# Patient Record
Sex: MALE | Race: WHITE | HISPANIC OR LATINO | Employment: FULL TIME | URBAN - METROPOLITAN AREA
[De-identification: names, ages, dates, MRNs, and addresses within clinical notes are randomized per-mention and may not be internally consistent; named-entity substitution may affect disease eponyms.]

---

## 2019-02-25 ENCOUNTER — TELEPHONE (OUTPATIENT)
Dept: SCHEDULING | Facility: IMAGING CENTER | Age: 24
End: 2019-02-25

## 2019-04-11 ENCOUNTER — OFFICE VISIT (OUTPATIENT)
Dept: MEDICAL GROUP | Facility: LAB | Age: 24
End: 2019-04-11
Payer: COMMERCIAL

## 2019-04-11 VITALS
SYSTOLIC BLOOD PRESSURE: 120 MMHG | OXYGEN SATURATION: 98 % | WEIGHT: 261.6 LBS | DIASTOLIC BLOOD PRESSURE: 62 MMHG | HEART RATE: 76 BPM | HEIGHT: 70 IN | BODY MASS INDEX: 37.45 KG/M2 | TEMPERATURE: 98.6 F

## 2019-04-11 DIAGNOSIS — Z23 NEED FOR VACCINATION: ICD-10-CM

## 2019-04-11 DIAGNOSIS — G89.29 CHRONIC PAIN OF RIGHT KNEE: ICD-10-CM

## 2019-04-11 DIAGNOSIS — E66.9 OBESITY (BMI 35.0-39.9 WITHOUT COMORBIDITY): ICD-10-CM

## 2019-04-11 DIAGNOSIS — M25.561 CHRONIC PAIN OF RIGHT KNEE: ICD-10-CM

## 2019-04-11 DIAGNOSIS — M79.644 THUMB PAIN, RIGHT: ICD-10-CM

## 2019-04-11 PROCEDURE — 90715 TDAP VACCINE 7 YRS/> IM: CPT | Performed by: FAMILY MEDICINE

## 2019-04-11 PROCEDURE — 90471 IMMUNIZATION ADMIN: CPT | Performed by: FAMILY MEDICINE

## 2019-04-11 PROCEDURE — 99204 OFFICE O/P NEW MOD 45 MIN: CPT | Mod: 25 | Performed by: FAMILY MEDICINE

## 2019-04-11 ASSESSMENT — PATIENT HEALTH QUESTIONNAIRE - PHQ9: CLINICAL INTERPRETATION OF PHQ2 SCORE: 0

## 2019-04-11 NOTE — PROGRESS NOTES
Subjective:     CC:  There were no encounter diagnoses.    HISTORY OF THE PRESENT ILLNESS: Patient is a 23 y.o. male. This pleasant patient is here today     Right knee pain:  This is a chronic unstable issue, new to me.  Patient states for the last 2 years he has had chronic right knee pain.  He states that it always been there but most recently it has been exacerbated and he has been unable to go to the gym.  He states that the pain is to the medial and lateral side of his patella.  He states that sometimes he hears a crunching noise.  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  He denies any trauma or injury.  He denies any effusion, popping, warmth, or erythema.    Thumb pain:  This is a chronic unstable issue, new to me.  Patient has a history of right thumb pain because he does separate the mail a lot.  He was seen by a chiropractor was told that he had arthritic pain.  He states this does not bother him as much but he often does find himself cracking his fingers.  He denies any numbness or tingling.  He denies any nodularity.    Obesity:  This is a chronic and stable issue, new to me.  Patient has a history of obesity and he states he has been overweight for a long time.  He does not do any current exercises his knee is been bothering him.  He does do exercise off and on if he wears a brace.  He does not have a particular low-fat or low carb diet.  He does have family history of diabetes.    Allergies: Patient has no known allergies.    Current Outpatient Prescriptions Ordered in [facility]   Medication Sig Dispense Refill   • Fish Oil-Cholecalciferol (OMEGA-3 + D PO) Take  by mouth.     • Multiple Vitamin (ONE-A-DAY MENS PO) Take  by mouth.       No current Epic-ordered facility-administered medications on file.        History reviewed. No pertinent past medical history.    History reviewed. No pertinent surgical history.    Social History   Substance Use Topics   • Smoking status:  "Never Smoker   • Smokeless tobacco: Never Used   • Alcohol use No       Social History     Social History Narrative   • No narrative on file       History reviewed. No pertinent family history.    ROS:   Gen: no fevers/chills, no changes in weight  Eyes: no changes in vision  ENT: no sore throat, no hearing loss, no bloody nose  Pulm: no sob, no cough  CV: no chest pain, no palpitations  GI: no nausea/vomiting, no diarrhea  : no dysuria  MSk: no myalgias, +Knee pain   Skin: no rash  Neuro: no headaches, no numbness/tingling  Heme/Lymph: no easy bruising    Objective:     Exam: /62 (BP Location: Left arm, Patient Position: Sitting)   Pulse 76   Temp 37 °C (98.6 °F) (Temporal)   Ht 1.778 m (5' 10\")   Wt 118.7 kg (261 lb 9.6 oz)   SpO2 98%  Body mass index is 37.54 kg/m².    General: Normal appearing. No distress.  HEENT: Normocephalic. Eyes conjunctiva clear lids without ptosis, pupils equal  and reactive to light accommodation, ears normal shape and contour, canals are clear bilaterally, tympanic membranes are benign, nasal mucosa benign, oropharynx is without erythema, edema or exudates.   Neck: Supple without JVD or bruit. Thyroid is not enlarged.  Pulmonary: Clear to ausculation.  Normal effort. No rales, ronchi, or wheezing.  Cardiovascular: Regular rate and rhythm without murmur. Carotid and radial pulses are intact and equal bilaterally.  Abdomen: Soft, nontender, nondistended. Normal bowel sounds. Liver and spleen are not palpable  Neurologic: Grossly nonfocal  Lymph: No cervical or supraclavicular lymph nodes are palpable  Skin: Warm and dry.  No obvious lesions.  Musculoskeletal: Normal gait. No extremity cyanosis, clubbing, or edema over R thumb. Knees: No erythema/edema/ecchymosis/effusion. Joint line tenderness. Patellar grind test +. Lachman's -. Jakob's -. ROM intact. 5/5 strength bilaterally. 2+ deep tendon reflexes bilaterally.  Psych: Normal mood and affect. Alert and oriented x3. " Judgment and insight is normal.    Assessment & Plan:   23 y.o. male with the following -    1. Chronic pain of right knee  Patient to do supportive care as discussed. Likely Arthritic vs patellofemoral syndrome.  Discussed weight loss and physical therapy.  Discussed brace use patient to also wear more supportive shoes as he is always on his feet and walking.  And we discussed proper carrying methods.  Follow-up post physical therapy.  - REFERRAL TO PHYSICAL THERAPY Reason for Therapy: Eval/Treat/Report    2. Obesity (BMI 35.0-39.9 without comorbidity)  Patient's weight was discussed today, including healthy low-carb diet, 30-minutes of moderate exercise daily and avoiding sugars and high-fat foods.    - Patient identified as having weight management issue.  Appropriate orders and counseling given.  - HEMOGLOBIN A1C; Future  - CBC WITH DIFFERENTIAL; Future  - Comp Metabolic Panel; Future  - Lipid Profile; Future  - TSH WITH REFLEX TO FT4; Future    3. Thumb pain, right  Discussed brace use and supportive care.  Patient also well to physical therapy for his right thumb pain.  Likely arthritic in nature.  Patient does overuse this trigger thumb.  Follow-up post physical therapy  - REFERRAL TO PHYSICAL THERAPY Reason for Therapy: Eval/Treat/Report    4. Need for vaccination  Administered today  - Tdap =>6yo IM      Please note that this dictation was created using voice recognition software. I have made every reasonable attempt to correct obvious errors, but I expect that there are errors of grammar and possibly content that I did not discover before finalizing the note.

## 2022-02-05 ENCOUNTER — HOSPITAL ENCOUNTER (OUTPATIENT)
Dept: RADIOLOGY | Facility: MEDICAL CENTER | Age: 27
End: 2022-02-05
Attending: FAMILY MEDICINE
Payer: COMMERCIAL

## 2022-02-05 ENCOUNTER — OFFICE VISIT (OUTPATIENT)
Dept: URGENT CARE | Facility: PHYSICIAN GROUP | Age: 27
End: 2022-02-05
Payer: COMMERCIAL

## 2022-02-05 VITALS
OXYGEN SATURATION: 99 % | WEIGHT: 286 LBS | SYSTOLIC BLOOD PRESSURE: 140 MMHG | BODY MASS INDEX: 40.04 KG/M2 | TEMPERATURE: 98.6 F | HEIGHT: 71 IN | DIASTOLIC BLOOD PRESSURE: 80 MMHG

## 2022-02-05 DIAGNOSIS — S99.911A INJURY OF RIGHT ANKLE, INITIAL ENCOUNTER: ICD-10-CM

## 2022-02-05 DIAGNOSIS — S93.401A SPRAIN OF RIGHT ANKLE, UNSPECIFIED LIGAMENT, INITIAL ENCOUNTER: ICD-10-CM

## 2022-02-05 PROCEDURE — 73610 X-RAY EXAM OF ANKLE: CPT | Mod: RT

## 2022-02-05 PROCEDURE — 99203 OFFICE O/P NEW LOW 30 MIN: CPT | Performed by: FAMILY MEDICINE

## 2022-02-05 RX ORDER — IBUPROFEN 200 MG
200 TABLET ORAL ONCE
Status: COMPLETED | OUTPATIENT
Start: 2022-02-05 | End: 2022-02-05

## 2022-02-05 RX ADMIN — Medication 200 MG: at 10:48

## 2022-02-05 ASSESSMENT — ENCOUNTER SYMPTOMS: ROS SKIN COMMENTS: NO ABRASION OR LACERATION

## 2022-02-05 NOTE — LETTER
February 5, 2022         Patient: Sukumar Leija   YOB: 1995   Date of Visit: 2/5/2022           To Whom it May Concern:    Sukumar Leija was seen in my clinic on 2/5/2022. Please excuse from work 2/7 through 2/9/2022.      Sincerely,           Jerome Nugent M.D.  Electronically Signed

## 2022-02-05 NOTE — PROGRESS NOTES
"Subjective     Sukumar Leija is a 26 y.o. male who presents with Ankle Injury (Rolled Rt ankle,on a rock walking Dogs. Happened lastnight, with swelling and pain)            Onset yesterday right ankle inversion injury.  He rolled it on a rock while walking his dog.  Pain and swelling lateral aspect.  Swelling is improved with elevation and compression.  There is some bruising.  He is ambulatory with limp.  No prior injury or surgery.  No other aggravating or alleviating factors.      Review of Systems   Musculoskeletal:        No knee pain   Skin:        No abrasion or laceration                Objective     /80   Temp 37 °C (98.6 °F)   Ht 1.803 m (5' 11\")   Wt (!) 130 kg (286 lb)   SpO2 99%   BMI 39.89 kg/m²      Physical Exam  Constitutional:       Appearance: Normal appearance.   Musculoskeletal:      Comments: R ankle: tender lateral malleolus. Stable anterior drawer. Lateral soft tissue swelling. No other point bony tenderness of ankle, foot, or leg. Distal neuro/vascular intact. Skin intact.      Neurological:      Mental Status: He is alert.                             Assessment & Plan       Xray: no fracture or dislocation per radiology    1. Injury of right ankle, initial encounter  DX-ANKLE 3+ VIEWS RIGHT    ibuprofen (MOTRIN) tablet 200 mg   2. Sprain of right ankle, unspecified ligament, initial encounter       Differential diagnosis, natural history, supportive care, and indications for immediate follow-up discussed at length.     Relative rest, ice, nsaid prn. Elevation and compression prn swelling. Resume activity as tolerated.    Ankle brace placed to use as needed.              "

## 2022-02-12 ENCOUNTER — TELEPHONE (OUTPATIENT)
Dept: URGENT CARE | Facility: PHYSICIAN GROUP | Age: 27
End: 2022-02-12

## 2022-09-09 ENCOUNTER — OFFICE VISIT (OUTPATIENT)
Dept: URGENT CARE | Facility: PHYSICIAN GROUP | Age: 27
End: 2022-09-09
Payer: COMMERCIAL

## 2022-09-09 VITALS
SYSTOLIC BLOOD PRESSURE: 136 MMHG | DIASTOLIC BLOOD PRESSURE: 80 MMHG | OXYGEN SATURATION: 98 % | RESPIRATION RATE: 16 BRPM | WEIGHT: 285 LBS | HEART RATE: 80 BPM | HEIGHT: 70 IN | BODY MASS INDEX: 40.8 KG/M2 | TEMPERATURE: 97.6 F

## 2022-09-09 DIAGNOSIS — S39.012A STRAIN OF LUMBAR REGION, INITIAL ENCOUNTER: ICD-10-CM

## 2022-09-09 PROCEDURE — 99213 OFFICE O/P EST LOW 20 MIN: CPT | Performed by: STUDENT IN AN ORGANIZED HEALTH CARE EDUCATION/TRAINING PROGRAM

## 2022-09-09 RX ORDER — METAXALONE 800 MG/1
800 TABLET ORAL 3 TIMES DAILY
COMMUNITY
End: 2023-07-21

## 2022-09-09 RX ORDER — CYCLOBENZAPRINE HCL 5 MG
5-10 TABLET ORAL 3 TIMES DAILY PRN
Qty: 15 TABLET | Refills: 0 | Status: SHIPPED | OUTPATIENT
Start: 2022-09-09 | End: 2023-07-21

## 2022-09-09 RX ORDER — NAPROXEN 500 MG/1
500 TABLET ORAL 2 TIMES DAILY WITH MEALS
Qty: 14 TABLET | Refills: 0 | Status: SHIPPED | OUTPATIENT
Start: 2022-09-09 | End: 2022-09-16

## 2022-09-09 RX ORDER — IBUPROFEN 200 MG
200 TABLET ORAL EVERY 6 HOURS PRN
COMMUNITY
End: 2022-11-19

## 2022-09-09 NOTE — LETTER
[facility] [place]  [address] [address]     September 9, 2022    Patient: Suukmar Leija   YOB: 1995   Date of Visit: 9/9/2022       To Whom It May Concern:    Sukumar Leija was seen and treated in our department on 9/9/2022.  Patient is excused from work on 9/9/2022 through 9/10/2022.  May return to work on 9/12/2022.  Please try and accommodate the patient as far as reducing excessive heavy lifting if possible.    Sincerely,     Anil Rowley P.A.-C.

## 2022-09-09 NOTE — PROGRESS NOTES
"Subjective:   Sukumar Leija is a 26 y.o. male who presents for Back Pain (Lower back pain, felt strain very hard. Hard to stand for prolonged time. )      HPI:  Pleasant 26-year-old male presents to clinic for 1 day of lower back pain.  He states that he was working on his car and was moving a car sun when he felt a strain in his lower back.  He reports a history of lumbar strain approximately 3 years ago.  He states that the pain is mainly on the right side of his lower back and is worse with standing up and sitting.  He is also uncomfortable when he bends forward.  He denies any numbness, tingling, burning, radiation of pain into the legs, saddle anesthesia, loss of bladder control, loss of bowel control, syncopal episode, headache, dizziness, chest pain, palpitations, shortness of breath, nausea, vomiting, abdominal pain, blurred vision, double vision, fever, or chills.      Medications:    cyclobenzaprine  ibuprofen Tabs  metaxalone Tabs  naproxen Tabs    Allergies: Patient has no known allergies.    Problem List: Sukumar Leija does not have any pertinent problems on file.    Surgical History:  No past surgical history on file.    Past Social Hx: Sukumar Leija  reports that he has never smoked. He has never used smokeless tobacco. He reports that he does not drink alcohol and does not use drugs.     Past Family Hx:  Sukumar Leija family history includes No Known Problems in his brother, father, mother, and sister.     Problem list, medications, and allergies reviewed by myself today in Epic.     Objective:     /80   Pulse 80   Temp 36.4 °C (97.6 °F) (Tympanic)   Resp 16   Ht 1.778 m (5' 10\")   Wt (!) 129 kg (285 lb)   SpO2 98%   BMI 40.89 kg/m²     Physical Exam  Vitals reviewed.   Constitutional:       General: He is not in acute distress.     Appearance: Normal appearance.   Eyes:      Pupils: Pupils are equal, round, and reactive to light.   Cardiovascular:      Rate and Rhythm: Normal rate and " regular rhythm.      Pulses: Normal pulses.      Heart sounds: Normal heart sounds. No murmur heard.  Pulmonary:      Effort: Pulmonary effort is normal. No respiratory distress.      Breath sounds: Normal breath sounds. No stridor. No wheezing, rhonchi or rales.   Musculoskeletal:      Lumbar back: Spasms present. No swelling, deformity, lacerations, tenderness or bony tenderness. Normal range of motion. Negative right straight leg raise test and negative left straight leg raise test.      Comments: Lumbar back: Increased muscle tone on the right side when compared to the left.  No crepitus, step-offs, or midline spinal tenderness.  Sensation intact.  Pain is worse with standing, sitting to standing, sitting, and bending forward.  2+ patellar reflexes bilaterally.   Skin:     General: Skin is warm and dry.      Capillary Refill: Capillary refill takes less than 2 seconds.      Findings: No erythema, lesion or rash.   Neurological:      General: No focal deficit present.      Mental Status: He is alert and oriented to person, place, and time.       Assessment/Plan:     Diagnosis and associated orders:     1. Strain of lumbar region, initial encounter  naproxen (NAPROSYN) 500 MG Tab    cyclobenzaprine (FLEXERIL) 5 mg tablet         Comments/MDM:     Patient's presentation physical exam findings are consistent with strain of the lumbar back specifically on the right side.  No radicular symptoms.  He is neurovascularly intact.  No signs of cauda equina.  Was given prescription for naproxen and cyclobenzaprine.  He was educated on the use of both these medications and the possible side effects.  He was advised not to drive or go to work while using his muscle relaxant.  He was advised on increased risk of falls with this medication.  He is agreeable to the plan.  Discussed using heat 3-5 times per day for 20 minutes at a time.  Perform frequent 3 to 5 minutes walks as tolerated.  Lying in 9090 position.  ED/return  precautions were given.  Patient has good understanding the signs and symptoms of warrant immediate reevaluation.         Differential diagnosis, natural history, supportive care, and indications for immediate follow-up discussed.    Advised the patient to follow-up with the primary care physician for recheck, reevaluation, and consideration of further management.    Please note that this dictation was created using voice recognition software. I have made a reasonable attempt to correct obvious errors, but I expect that there are errors of grammar and possibly content that I did not discover before finalizing the note.    Electronically signed by Anil Rowley PA-C.

## 2022-11-19 ENCOUNTER — OFFICE VISIT (OUTPATIENT)
Dept: URGENT CARE | Facility: PHYSICIAN GROUP | Age: 27
End: 2022-11-19
Payer: COMMERCIAL

## 2022-11-19 VITALS
SYSTOLIC BLOOD PRESSURE: 138 MMHG | HEART RATE: 81 BPM | TEMPERATURE: 97.7 F | BODY MASS INDEX: 38.64 KG/M2 | HEIGHT: 71 IN | WEIGHT: 276 LBS | DIASTOLIC BLOOD PRESSURE: 88 MMHG | OXYGEN SATURATION: 100 % | RESPIRATION RATE: 20 BRPM

## 2022-11-19 DIAGNOSIS — H66.002 NON-RECURRENT ACUTE SUPPURATIVE OTITIS MEDIA OF LEFT EAR WITHOUT SPONTANEOUS RUPTURE OF TYMPANIC MEMBRANE: ICD-10-CM

## 2022-11-19 PROCEDURE — 99213 OFFICE O/P EST LOW 20 MIN: CPT | Performed by: NURSE PRACTITIONER

## 2022-11-19 RX ORDER — IBUPROFEN 800 MG/1
800 TABLET ORAL EVERY 8 HOURS PRN
Qty: 30 TABLET | Refills: 0 | Status: SHIPPED | OUTPATIENT
Start: 2022-11-19 | End: 2022-11-29

## 2022-11-19 RX ORDER — AMOXICILLIN AND CLAVULANATE POTASSIUM 875; 125 MG/1; MG/1
1 TABLET, FILM COATED ORAL 2 TIMES DAILY
Qty: 14 TABLET | Refills: 0 | Status: SHIPPED | OUTPATIENT
Start: 2022-11-19 | End: 2022-11-26

## 2022-11-19 ASSESSMENT — ENCOUNTER SYMPTOMS
GASTROINTESTINAL NEGATIVE: 1
CONSTITUTIONAL NEGATIVE: 1
EYES NEGATIVE: 1
FEVER: 0
RESPIRATORY NEGATIVE: 1
CARDIOVASCULAR NEGATIVE: 1

## 2022-11-19 ASSESSMENT — PAIN SCALES - GENERAL: PAINLEVEL: 4=SLIGHT-MODERATE PAIN

## 2022-11-19 NOTE — PROGRESS NOTES
"Subjective:   Sukumar Leija is a 27 y.o. male who presents for Otalgia (Left, per pt dryness of ear canal)      Patient presents today with a 3-day history of onset of acute left ear pain.  He does have an ear camera with software, and shows me the images of the progression of the redness and exudates.  His right ear is not affected.  Patient denies any other systemic symptoms.  No fevers no chills.  He does not have a history of frequent ear infections.      Otalgia   There is pain in the left ear. This is a new problem. Episode onset: three days ago. The problem occurs constantly. The problem has been gradually worsening. There has been no fever. The pain is at a severity of 6/10. The pain is moderate. Associated symptoms include ear discharge. He has tried NSAIDs for the symptoms. The treatment provided mild relief.     Review of Systems   Constitutional: Negative.  Negative for fever.   HENT:  Positive for ear discharge and ear pain.    Eyes: Negative.    Respiratory: Negative.     Cardiovascular: Negative.    Gastrointestinal: Negative.    Skin: Negative.      Medications, Allergies, and current problem list reviewed today in Epic.     Objective:     /88 (BP Location: Left arm, Patient Position: Sitting, BP Cuff Size: Adult)   Pulse 81   Temp 36.5 °C (97.7 °F) (Temporal)   Resp 20   Ht 1.803 m (5' 11\")   Wt (!) 125 kg (276 lb)   SpO2 100%     Physical Exam  Vitals reviewed.   Constitutional:       Appearance: Normal appearance.   HENT:      Head: Normocephalic and atraumatic.      Right Ear: Tympanic membrane, ear canal and external ear normal.      Left Ear: Drainage, swelling and tenderness present. Tympanic membrane is erythematous and bulging.      Nose: Nose normal.      Mouth/Throat:      Mouth: Mucous membranes are moist.      Pharynx: Oropharynx is clear.   Eyes:      Extraocular Movements: Extraocular movements intact.      Conjunctiva/sclera: Conjunctivae normal.      Pupils: Pupils are " equal, round, and reactive to light.   Cardiovascular:      Rate and Rhythm: Normal rate and regular rhythm.      Pulses: Normal pulses.      Heart sounds: Normal heart sounds.   Pulmonary:      Effort: Pulmonary effort is normal.      Breath sounds: Normal breath sounds.   Abdominal:      General: Abdomen is flat. Bowel sounds are normal.      Palpations: Abdomen is soft.   Musculoskeletal:         General: Normal range of motion.      Cervical back: Normal range of motion and neck supple.   Skin:     General: Skin is warm and dry.      Capillary Refill: Capillary refill takes less than 2 seconds.   Neurological:      General: No focal deficit present.      Mental Status: He is alert and oriented to person, place, and time.   Psychiatric:         Mood and Affect: Mood normal.         Behavior: Behavior normal.       Assessment/Plan:     Diagnosis and associated orders:     1. Non-recurrent acute suppurative otitis media of left ear without spontaneous rupture of tympanic membrane  amoxicillin-clavulanate (AUGMENTIN) 875-125 MG Tab    ibuprofen (MOTRIN) 800 MG Tab         Comments/MDM:     Provided patient information on the etiology and pathogenesis of otitis media. Instructed to take antibiotics as prescribed. Ibuprofen 800 mg TID PRN was given to patient for pain. May apply warm compress to the ear for prn discomfort. RTC in 2 weeks for reevaluation, sooner if not improved.           Differential diagnosis, natural history, supportive care, and indications for immediate follow-up discussed.    Advised the patient to follow-up with the primary care physician for recheck, reevaluation, and consideration of further management.    Please note that this dictation was created using voice recognition software. I have made a reasonable attempt to correct obvious errors, but I expect that there are errors of grammar and possibly content that I did not discover before finalizing the note.    This note was electronically  signed by DOYLE Montenegro

## 2022-11-19 NOTE — LETTER
November 19, 2022       Patient: Sukumar Leija   YOB: 1995   Date of Visit: 11/19/2022         To Whom It May Concern:    In my medical opinion, I recommend that Sukumar Leija be excused from work today due to medical condition.    If you have any questions or concerns, please don't hesitate to call [phone]          Sincerely,          JM Pena  Electronically Signed

## 2022-12-24 ENCOUNTER — APPOINTMENT (OUTPATIENT)
Dept: URGENT CARE | Facility: PHYSICIAN GROUP | Age: 27
End: 2022-12-24
Payer: COMMERCIAL

## 2022-12-24 ENCOUNTER — OFFICE VISIT (OUTPATIENT)
Dept: URGENT CARE | Facility: PHYSICIAN GROUP | Age: 27
End: 2022-12-24
Payer: COMMERCIAL

## 2022-12-24 VITALS
SYSTOLIC BLOOD PRESSURE: 122 MMHG | RESPIRATION RATE: 16 BRPM | BODY MASS INDEX: 38.64 KG/M2 | OXYGEN SATURATION: 98 % | TEMPERATURE: 97.1 F | HEART RATE: 100 BPM | HEIGHT: 71 IN | WEIGHT: 276 LBS | DIASTOLIC BLOOD PRESSURE: 76 MMHG

## 2022-12-24 DIAGNOSIS — M62.830 LUMBAR PARASPINAL MUSCLE SPASM: ICD-10-CM

## 2022-12-24 PROCEDURE — 99213 OFFICE O/P EST LOW 20 MIN: CPT | Performed by: PHYSICIAN ASSISTANT

## 2022-12-24 RX ORDER — IBUPROFEN 200 MG
200 TABLET ORAL EVERY 6 HOURS PRN
COMMUNITY
End: 2023-07-21

## 2022-12-24 RX ORDER — METHYLPREDNISOLONE 4 MG/1
TABLET ORAL
Qty: 1 EACH | Refills: 0 | Status: SHIPPED | OUTPATIENT
Start: 2022-12-24 | End: 2023-07-21

## 2022-12-24 ASSESSMENT — ENCOUNTER SYMPTOMS
CHILLS: 0
WEAKNESS: 0
VOMITING: 0
SENSORY CHANGE: 1
NAUSEA: 0
TINGLING: 0
FLANK PAIN: 0
BACK PAIN: 1
FOCAL WEAKNESS: 0
FEVER: 0

## 2022-12-24 NOTE — LETTER
December 24, 2022       Patient: Sukumar Leija   YOB: 1995   Date of Visit: 12/24/2022         To Whom It May Concern:    In my medical opinion, I recommend that Sukumar Leija should be excused from work for next Tuesday and Wednesday, 12/27 and 12/28.      If you have any questions or concerns, please don't hesitate to call [phone]          Sincerely,          Michael Suárez P.A.-C.  Electronically Signed

## 2022-12-24 NOTE — PROGRESS NOTES
"Subjective:   Sukumar Leija  is a 27 y.o. male who presents for Flank Pain (X 1 day, R lower flank pain, throbbing pain, standing up and sitting down causes pain to worsen )      HPI  Patient presents urgent care noting pain to right low back after lifting a package yesterday.  Patient states he bent over to lift a heavier package yesterday.  As he flipped it on right side he felt a stinging shooting pain on right low back.  He states the pain traveled down right leg just below the knee.  Patient notes past medical history of 2 similar episodes once around 4 years ago had another time this last fall.  Patient states he took describe a week to improve.  Last time he was prescribed naproxen and cyclobenzaprine.  Patient states he did not take these medications but did improve thereafter.  Denies numbness tingling weakness.  Denies saddle anesthesia or incontinence.  Denies fevers chills nausea vomiting.  Denies abdominal pain.  Denies dysuria frequency urgency or hematuria.  Denies any abnormal urinary symptoms.  Patient has tried stretching for symptoms thus far. Patient denies a history of back surgeries or significant injuries.        Review of Systems   Constitutional:  Negative for chills and fever.   Gastrointestinal:  Negative for nausea and vomiting.   Genitourinary: Negative.  Negative for dysuria, flank pain, frequency and hematuria.   Musculoskeletal:  Positive for back pain.   Skin:  Negative for rash.   Neurological:  Positive for sensory change (radiating pain). Negative for tingling, focal weakness and weakness.     Allergies   Allergen Reactions    Lactose         Objective:   /76   Pulse 100   Temp 36.2 °C (97.1 °F) (Temporal)   Resp 16   Ht 1.803 m (5' 11\")   Wt (!) 125 kg (276 lb)   SpO2 98%   BMI 38.49 kg/m²     Physical Exam  Vitals and nursing note reviewed.   Constitutional:       General: He is not in acute distress.     Appearance: Normal appearance. He is well-developed. He is " not diaphoretic.   HENT:      Head: Normocephalic and atraumatic.      Right Ear: External ear normal.      Left Ear: External ear normal.      Nose: Nose normal.   Eyes:      General: No scleral icterus.        Right eye: No discharge.         Left eye: No discharge.      Conjunctiva/sclera: Conjunctivae normal.   Pulmonary:      Effort: Pulmonary effort is normal. No respiratory distress.   Musculoskeletal:      Cervical back: Neck supple.      Lumbar back: Spasms and tenderness ( primary paraspinous) present. No swelling, edema, deformity, signs of trauma, lacerations or bony tenderness. Decreased range of motion ( 2/2 pain). Negative right straight leg raise test and negative left straight leg raise test.   Skin:     General: Skin is warm and dry.      Coloration: Skin is not pale.   Neurological:      Mental Status: He is alert and oriented to person, place, and time. He is not disoriented.      Sensory: No sensory deficit.      Coordination: Coordination normal.      Deep Tendon Reflexes: Reflexes are normal and symmetric.      Comments: SLR normal bilat       Assessment/Plan:   1. Lumbar paraspinal muscle spasm  - methylPREDNISolone (MEDROL DOSEPAK) 4 MG Tablet Therapy Pack; Take as directed on package.  Dispense one package.  Dispense: 1 Each; Refill: 0    Other orders  - ibuprofen (MOTRIN) 200 MG Tab; Take 200 mg by mouth every 6 hours as needed.  Recommend conservative care, rest, ice, elevation, work on gentle ROM exercises, Cautioned regarding potential side effects of steroid, avoid nsaids while using  Recommend to use the cyclobenzaprine patient has at home and has not used yet however  Return to clinic with lack of resolution or progression of symptoms.      I have worn an N95 mask, gloves and eye protection for the entire encounter with this patient.     Differential diagnosis, natural history, supportive care, and indications for immediate follow-up discussed.

## 2023-07-21 ENCOUNTER — OFFICE VISIT (OUTPATIENT)
Dept: URGENT CARE | Facility: PHYSICIAN GROUP | Age: 28
End: 2023-07-21
Payer: COMMERCIAL

## 2023-07-21 VITALS
SYSTOLIC BLOOD PRESSURE: 128 MMHG | RESPIRATION RATE: 18 BRPM | HEART RATE: 75 BPM | TEMPERATURE: 97.6 F | DIASTOLIC BLOOD PRESSURE: 76 MMHG | OXYGEN SATURATION: 99 % | BODY MASS INDEX: 42.32 KG/M2 | HEIGHT: 70 IN | WEIGHT: 295.6 LBS

## 2023-07-21 DIAGNOSIS — H66.004 RECURRENT ACUTE SUPPURATIVE OTITIS MEDIA OF RIGHT EAR WITHOUT SPONTANEOUS RUPTURE OF TYMPANIC MEMBRANE: ICD-10-CM

## 2023-07-21 PROCEDURE — 3078F DIAST BP <80 MM HG: CPT | Performed by: FAMILY MEDICINE

## 2023-07-21 PROCEDURE — 99213 OFFICE O/P EST LOW 20 MIN: CPT | Performed by: FAMILY MEDICINE

## 2023-07-21 PROCEDURE — 3074F SYST BP LT 130 MM HG: CPT | Performed by: FAMILY MEDICINE

## 2023-07-21 RX ORDER — IBUPROFEN 800 MG/1
800 TABLET ORAL EVERY 8 HOURS PRN
Qty: 30 TABLET | Refills: 0 | Status: SHIPPED | OUTPATIENT
Start: 2023-07-21 | End: 2023-07-31

## 2023-07-21 RX ORDER — AMOXICILLIN AND CLAVULANATE POTASSIUM 875; 125 MG/1; MG/1
1 TABLET, FILM COATED ORAL 2 TIMES DAILY
Qty: 14 TABLET | Refills: 0 | Status: SHIPPED | OUTPATIENT
Start: 2023-07-21 | End: 2023-07-28

## 2023-07-21 ASSESSMENT — ENCOUNTER SYMPTOMS
NAUSEA: 0
EYE DISCHARGE: 0
WEIGHT LOSS: 0
EYE REDNESS: 0
MYALGIAS: 0
VOMITING: 0

## 2023-07-21 NOTE — LETTER
July 21, 2023         Patient: Sukumar Leija   YOB: 1995   Date of Visit: 7/21/2023           To Whom it May Concern:    Sukumar Leija was seen in my clinic on 7/21/2023. Please excuse from work 7/21 and 7/22/2023. He may then return to his next scheduled shift to full duty.       Sincerely,           Jerome Nugent M.D.  Electronically Signed

## 2023-07-21 NOTE — PROGRESS NOTES
"Yari Leija is a 27 y.o. male who presents with Otalgia (X 5 days, R sided ear px )            5 days right ear pain.  No nasal congestion or viral prodrome.  No trauma or barotrauma.  No hearing loss.  No recent swimming.  PMH recurrent otitis media.  No other aggravating or alleviating factors.        Review of Systems   Constitutional:  Negative for malaise/fatigue and weight loss.   Eyes:  Negative for discharge and redness.   Gastrointestinal:  Negative for nausea and vomiting.   Musculoskeletal:  Negative for joint pain and myalgias.   Skin:  Negative for itching and rash.              Objective     /76   Pulse 75   Temp 36.4 °C (97.6 °F) (Temporal)   Resp 18   Ht 1.778 m (5' 10\")   Wt (!) 134 kg (295 lb 9.6 oz)   SpO2 99%   BMI 42.41 kg/m²      Physical Exam  Constitutional:       General: He is not in acute distress.     Appearance: He is well-developed.   HENT:      Head: Normocephalic and atraumatic.      Left Ear: Tympanic membrane normal.      Ears:      Comments: Right TM is red and bulging  Eyes:      Conjunctiva/sclera: Conjunctivae normal.   Cardiovascular:      Rate and Rhythm: Normal rate and regular rhythm.      Heart sounds: Normal heart sounds. No murmur heard.  Pulmonary:      Effort: Pulmonary effort is normal.      Breath sounds: Normal breath sounds. No wheezing.   Skin:     General: Skin is warm and dry.      Findings: No rash.   Neurological:      Mental Status: He is alert.                             Assessment & Plan     1. Recurrent acute suppurative otitis media of right ear without spontaneous rupture of tympanic membrane  amoxicillin-clavulanate (AUGMENTIN) 875-125 MG Tab    Referral to establish with [facility] PCP    ibuprofen (MOTRIN) 800 MG Tab        Differential diagnosis, natural history, supportive care, and indications for immediate follow-up were discussed.              "

## 2023-11-10 ENCOUNTER — APPOINTMENT (OUTPATIENT)
Dept: MEDICAL GROUP | Facility: PHYSICIAN GROUP | Age: 28
End: 2023-11-10
Payer: COMMERCIAL

## 2023-12-06 ENCOUNTER — OFFICE VISIT (OUTPATIENT)
Dept: MEDICAL GROUP | Facility: PHYSICIAN GROUP | Age: 28
End: 2023-12-06
Payer: COMMERCIAL

## 2023-12-06 VITALS
BODY MASS INDEX: 41.3 KG/M2 | TEMPERATURE: 97.1 F | WEIGHT: 295 LBS | HEART RATE: 96 BPM | DIASTOLIC BLOOD PRESSURE: 76 MMHG | RESPIRATION RATE: 20 BRPM | OXYGEN SATURATION: 100 % | HEIGHT: 71 IN | SYSTOLIC BLOOD PRESSURE: 114 MMHG

## 2023-12-06 DIAGNOSIS — Z13.29 SCREENING FOR ENDOCRINE, METABOLIC AND IMMUNITY DISORDER: ICD-10-CM

## 2023-12-06 DIAGNOSIS — L85.8 KERATOSIS PILARIS: ICD-10-CM

## 2023-12-06 DIAGNOSIS — G89.29 CHRONIC RIGHT-SIDED LOW BACK PAIN WITH RIGHT-SIDED SCIATICA: ICD-10-CM

## 2023-12-06 DIAGNOSIS — M54.41 CHRONIC RIGHT-SIDED LOW BACK PAIN WITH RIGHT-SIDED SCIATICA: ICD-10-CM

## 2023-12-06 DIAGNOSIS — Z13.0 SCREENING FOR ENDOCRINE, METABOLIC AND IMMUNITY DISORDER: ICD-10-CM

## 2023-12-06 DIAGNOSIS — Z23 NEED FOR VACCINATION: ICD-10-CM

## 2023-12-06 DIAGNOSIS — Z13.228 SCREENING FOR ENDOCRINE, METABOLIC AND IMMUNITY DISORDER: ICD-10-CM

## 2023-12-06 DIAGNOSIS — F98.8 ATTENTION DEFICIT DISORDER, UNSPECIFIED HYPERACTIVITY PRESENCE: ICD-10-CM

## 2023-12-06 DIAGNOSIS — E55.9 VITAMIN D DEFICIENCY: ICD-10-CM

## 2023-12-06 DIAGNOSIS — R07.89 CHEST PRESSURE: ICD-10-CM

## 2023-12-06 PROCEDURE — 90471 IMMUNIZATION ADMIN: CPT

## 2023-12-06 PROCEDURE — 3074F SYST BP LT 130 MM HG: CPT

## 2023-12-06 PROCEDURE — 99214 OFFICE O/P EST MOD 30 MIN: CPT | Mod: 25

## 2023-12-06 PROCEDURE — 3078F DIAST BP <80 MM HG: CPT

## 2023-12-06 PROCEDURE — 90686 IIV4 VACC NO PRSV 0.5 ML IM: CPT

## 2023-12-06 RX ORDER — IBUPROFEN 800 MG/1
800 TABLET ORAL EVERY 8 HOURS PRN
Qty: 30 TABLET | Refills: 1 | Status: SHIPPED | OUTPATIENT
Start: 2023-12-06 | End: 2024-02-22 | Stop reason: SDUPTHER

## 2023-12-06 ASSESSMENT — ENCOUNTER SYMPTOMS
SHORTNESS OF BREATH: 0
WHEEZING: 0
CONSTIPATION: 0
HEADACHES: 0
PALPITATIONS: 0
ABDOMINAL PAIN: 0
TINGLING: 0
FEVER: 0
DIARRHEA: 0
BLOOD IN STOOL: 0
CHILLS: 0
WEIGHT LOSS: 0
COUGH: 0
DIZZINESS: 0

## 2023-12-06 ASSESSMENT — PATIENT HEALTH QUESTIONNAIRE - PHQ9: CLINICAL INTERPRETATION OF PHQ2 SCORE: 0

## 2023-12-06 NOTE — PROGRESS NOTES
"Subjective:     Chief Complaint   Patient presents with    Establish Care    Other     Chest pressure x 3 years      Sukumar Leija is a 28 y.o. male, who is here today to establish care and discuss chest pressure and medication refill. His prior PCP was at [facility] in [place].    Problem   Chronic Right-Sided Low Back Pain With Right-Sided Sciatica    Chronic problem.    In 2019, patient was at work when he lifted a very heavy package causing low back pain.  Went to urgent care after this occurred was prescribed muscle relaxer.  Physical therapy was not prescribed.  No imaging was done.  Since then patient occasionally will take a 800 mg ibuprofen once a week.  Regularly sees a chiropractor which he has an appointment for today.    Requesting refill of ibuprofen.     Chest Pressure    Chronic problem.  Patient began having this chest pressure or slight heaviness approximately 3 years ago around 2019.  Patient reports that this time he had lost 30 pounds and then gained back the weight plus more.  This was around the same time he had a back injury at work.  Denies any chest pain, headaches, lightheadedness/dizziness.    Positive for occasional shortness of breath but not with every 1 of these episodes.    Denies history of anxiety but patient does note that he is concerned about his sister and her drug/alcohol problems at times.     Keratosis Pilaris    Chronic problem.  Patient has had this dry bumpy, \"dot-like\" skin on the back of his upper arms for several years now.  Patient reports that his sisters also have this.  Patient is concerned that it is a problem.     Bmi 40.0-44.9, Adult (Hcc)    Chronic problem.  Exercise: Works as a mailman.  Has not been going to the gym regularly but is going to start.  Diet: Not a lot of red meat mostly eats chicken or turkey.  Does note that he eats out a lot such as [employer] or other fast food due to his job.  Reported trying meal prep/planning but gets bored with " "this.  Patient concerned about developing diabetes as he recently found out both of his grandmothers passed away from diabetes.  His mother may possibly have prediabetes.        Allergies: Lactose    Current Outpatient Medications:     ibuprofen (MOTRIN) 800 MG Tab, Take 1 Tablet by mouth every 8 hours as needed for Moderate Pain., Disp: 30 Tablet, Rfl: 1     Review of Systems   Constitutional:  Negative for chills, fever and weight loss.   Respiratory:  Negative for cough, shortness of breath and wheezing.    Cardiovascular:  Negative for chest pain, palpitations and leg swelling.   Gastrointestinal:  Negative for abdominal pain, blood in stool, constipation and diarrhea.   Genitourinary:  Negative for hematuria.   Neurological:  Negative for dizziness, tingling and headaches.     Health Maintenance: Completed    Objective:     /76   Pulse 96   Temp 36.2 °C (97.1 °F) (Temporal)   Resp 20   Ht 1.803 m (5' 11\")   Wt (!) 134 kg (295 lb)   SpO2 100%  Body mass index is 41.14 kg/m².    Physical Exam  Vitals reviewed.   Constitutional:       Appearance: Normal appearance.   Cardiovascular:      Rate and Rhythm: Normal rate and regular rhythm.   Pulmonary:      Effort: Pulmonary effort is normal.      Breath sounds: Normal breath sounds.   Abdominal:      Palpations: Abdomen is soft.   Musculoskeletal:         General: Normal range of motion.   Skin:     General: Skin is warm and dry.   Neurological:      General: No focal deficit present.      Mental Status: He is alert.   Psychiatric:         Mood and Affect: Mood normal.         Behavior: Behavior normal.     Assessment & Plan:     The following plan was discussed through shared decision making with the patient:    1. Chest pressure  Chronic, controlled.  Per HPI and physical exam, seems more anxiety related.  Discussed possible breathing exercises.  Patient also feels like this is a lot to do with his weight gain.  Instructed patient that if the symptoms " persist or worsen to seek urgent or emergent care for further follow-up and evaluation.  In office EKG unavailable today, may consider in the future for baseline evaluation.    2. BMI 40.0-44.9, adult (HCC)  Chronic, uncontrolled.  Positive family history for diabetes.  Patient reports over the holiday weekend his mother checked his morning fasting blood sugars, which were only slightly above 100.    Recommendations for healthy lifestyle include:  - Good rule of thumb for portions = size of the palm of your hand  - Eliminate or decrease the amount of all white carbohydrates such as white rice, white bread, white pasta, potatoes, etc.    - Focus on whole foods that are less processed foods and more of plant/animal proteins; great examples are lean poultry or fish, vegetables, nuts, beans, and berries.   - Aim for at least 15-20 grams of fiber per day.    - Avoid drinking sugary beverages such as juice, soda, specialty coffee; these have very little nutritional value and are high in calories.   - Exercise: 150 minutes of moderate aerobic activity per week OR 75 minutes of vigorous aerobic activity per week + 2 days per week of strength  Discussed these recommendations at length. We will check updated labs.  - Comp Metabolic Panel; Future  - HEMOGLOBIN A1C; Future  - Lipid Profile; Future  - TSH WITH REFLEX TO FT4; Future    3. Chronic right-sided low back pain with right-sided sciatica  Chronic, controlled.  History of back strain from his work.  Takes ibuprofen 800 mg daily very sparingly.  Recommended that if this worsens may consider physical therapy or imaging such as an x-ray of his lumbar spine.  - ibuprofen (MOTRIN) 800 MG Tab; Take 1 Tablet by mouth every 8 hours as needed for Moderate Pain.  Dispense: 30 Tablet; Refill: 1    4. Attention deficit disorder, unspecified hyperactivity presence  Chronic, uncontrolled.  Patient reports being diagnosed as a child and being on medication but has not been on  mediaction for some time.  Would like to consider restarting.  - Referral to Psychiatry    5. Keratosis pilaris  Chronic, uncontrolled.  Educated patient on pathogenesis and etiology of KP. Discussed daily exfoliation with patient and utilizing specific hydrating lotions such as CeraVe, Eucerin, or AmLactin which can help minimize the bumps.    6. Screening for endocrine, metabolic and immunity disorder  - CBC WITH DIFFERENTIAL; Future    7. Vitamin D deficiency  - VITAMIN D,25 HYDROXY (DEFICIENCY); Future    8. Need for vaccination  - Influenza Vaccine Quad Injection (PF)      Return in about 3 months (around 3/6/2024) for Labs.         Please note that this dictation was created using voice recognition software. I have made every reasonable attempt to correct obvious errors, but I expect that there are errors of grammar and possibly content that I did not discover before finalizing the note.    DOYLE Myers  [facility]  [facility]

## 2023-12-16 ENCOUNTER — OFFICE VISIT (OUTPATIENT)
Dept: URGENT CARE | Facility: PHYSICIAN GROUP | Age: 28
End: 2023-12-16
Payer: COMMERCIAL

## 2023-12-16 ENCOUNTER — HOSPITAL ENCOUNTER (OUTPATIENT)
Dept: RADIOLOGY | Facility: MEDICAL CENTER | Age: 28
End: 2023-12-16
Attending: NURSE PRACTITIONER
Payer: COMMERCIAL

## 2023-12-16 VITALS
WEIGHT: 296 LBS | RESPIRATION RATE: 16 BRPM | SYSTOLIC BLOOD PRESSURE: 118 MMHG | TEMPERATURE: 97.3 F | OXYGEN SATURATION: 97 % | DIASTOLIC BLOOD PRESSURE: 76 MMHG | BODY MASS INDEX: 41.44 KG/M2 | HEART RATE: 117 BPM | HEIGHT: 71 IN

## 2023-12-16 DIAGNOSIS — M25.562 ACUTE PAIN OF LEFT KNEE: ICD-10-CM

## 2023-12-16 PROCEDURE — 3078F DIAST BP <80 MM HG: CPT | Performed by: NURSE PRACTITIONER

## 2023-12-16 PROCEDURE — 99213 OFFICE O/P EST LOW 20 MIN: CPT | Performed by: NURSE PRACTITIONER

## 2023-12-16 PROCEDURE — 3074F SYST BP LT 130 MM HG: CPT | Performed by: NURSE PRACTITIONER

## 2023-12-16 PROCEDURE — 73562 X-RAY EXAM OF KNEE 3: CPT | Mod: LT

## 2023-12-16 ASSESSMENT — ENCOUNTER SYMPTOMS
FOCAL WEAKNESS: 0
MYALGIAS: 0
TINGLING: 0
FEVER: 0
SENSORY CHANGE: 0
CHILLS: 0

## 2023-12-16 NOTE — LETTER
December 16, 2023        Sukumar Heladio  [address]  [place] [address]        Sukumar was seen in our clinic today and he is excused from work. Thank you.   If you have any questions or concerns, please don't hesitate to call.        Sincerely,        ENRIQUE Salmeron.P.CANELO.    Electronically Signed

## 2023-12-16 NOTE — PROGRESS NOTES
Subjective     Sukumar Leija is a 28 y.o. male who presents with Knee Pain (Left side, inner knee pain, constant pain, sharp pain with movement /X 3 days )            HPI  New problem.  Patient is a very pleasant 28-year-old male who presents with left-sided knee pain on the inner aspect of his knee for 3 days.  He reports constant sharp pain with movement.  He denies any trauma or prior history of knee issues.  He does work as a  for work.  He denies any associated ankle pain, or hip pain.  He has been wearing a knee brace for work however no other treatment plan.    Lactose  Current Outpatient Medications on File Prior to Visit   Medication Sig Dispense Refill    ibuprofen (MOTRIN) 800 MG Tab Take 1 Tablet by mouth every 8 hours as needed for Moderate Pain. 30 Tablet 1     No current facility-administered medications on file prior to visit.     Social History     Socioeconomic History    Marital status: Single     Spouse name: Not on file    Number of children: Not on file    Years of education: Not on file    Highest education level: Not on file   Occupational History    Not on file   Tobacco Use    Smoking status: Former     Types: Cigarettes    Smokeless tobacco: Never   Vaping Use    Vaping Use: Never used   Substance and Sexual Activity    Alcohol use: No    Drug use: No    Sexual activity: Not Currently     Partners: Female   Other Topics Concern    Not on file   Social History Narrative    Not on file     Social Determinants of Health     Financial Resource Strain: Not on file   Food Insecurity: Not on file   Transportation Needs: Not on file   Physical Activity: Not on file   Stress: Not on file   Social Connections: Not on file   Intimate Partner Violence: Not on file   Housing Stability: Not on file     Breast Cancer-related family history is not on file.      Review of Systems   Constitutional:  Negative for chills and fever.   Musculoskeletal:  Positive for joint pain. Negative for  "myalgias.   Skin: Negative.    Neurological:  Negative for tingling, sensory change and focal weakness.   All other systems reviewed and are negative.             Objective     /76   Pulse (!) 117   Temp 36.3 °C (97.3 °F) (Temporal)   Resp 16   Ht 1.803 m (5' 11\")   Wt (!) 134 kg (296 lb)   SpO2 97%   BMI 41.28 kg/m²      Physical Exam  Constitutional:       Appearance: Normal appearance. He is obese. He is not ill-appearing.   Cardiovascular:      Rate and Rhythm: Regular rhythm. Tachycardia present.      Heart sounds: No murmur heard.  Pulmonary:      Effort: Pulmonary effort is normal.      Breath sounds: Normal breath sounds.   Musculoskeletal:         General: Normal range of motion.      Left knee: Crepitus present. No swelling, deformity, effusion, erythema or ecchymosis. Normal range of motion. Tenderness present over the medial joint line.   Skin:     General: Skin is warm and dry.      Findings: No bruising.   Neurological:      General: No focal deficit present.      Mental Status: He is alert and oriented to person, place, and time.                             Assessment & Plan        1. Acute pain of left knee  DX-KNEE 3 VIEWS Pain/Deformity Following Trauma        Imaging of left knee within normal limits.  Patient advised to continue the knee brace as needed for work.  He is advised on icing and the initiation of anti-inflammatories for this.  He is advised to keep elevated when he is not at work.  Follow-up as needed or if symptoms or not improving in the next 10 to 14 days.               "

## 2024-02-22 DIAGNOSIS — G89.29 CHRONIC RIGHT-SIDED LOW BACK PAIN WITH RIGHT-SIDED SCIATICA: ICD-10-CM

## 2024-02-22 DIAGNOSIS — M54.41 CHRONIC RIGHT-SIDED LOW BACK PAIN WITH RIGHT-SIDED SCIATICA: ICD-10-CM

## 2024-02-23 NOTE — TELEPHONE ENCOUNTER
Received request via: Patient    Was the patient seen in the last year in this department? Yes    Does the patient have an active prescription (recently filled or refills available) for medication(s) requested? No    Pharmacy Name: [facility]     Does the patient have [facility] Plus and need 100 day supply (blood pressure, diabetes and cholesterol meds only)? Patient does not have SCP

## 2024-02-26 RX ORDER — IBUPROFEN 800 MG/1
800 TABLET ORAL EVERY 8 HOURS PRN
Qty: 30 TABLET | Refills: 0 | Status: SHIPPED | OUTPATIENT
Start: 2024-02-26

## 2024-03-06 NOTE — PROGRESS NOTES
"Subjective:     Chief Complaint   Patient presents with    Follow-Up     HPI: Sukumar presents today with    Problem   Chest Pressure    Patient reports that this has improved since he has started working out more in the pain and closer attention to his diet.  Patient also was recently seen by a psychiatrist and started on Ritalin due to ADHD.  Patient states that a lot of his symptoms have improved since then.    Interval history:  12/6/2023- Patient began having this chest pressure or slight heaviness approximately 3 years ago around 2019.  Patient reports that this time he had lost 30 pounds and then gained back the weight plus more.  This was around the same time he had a back injury at work.  Denies any chest pain, headaches, lightheadedness/dizziness.    Positive for occasional shortness of breath but not with every 1 of these episodes.    Denies history of anxiety but patient does note that he is concerned about his sister and her drug/alcohol problems at times.     Keratosis Pilaris    Since his last visit, patient has been using more emollient lotions and exfoliating scrubs.  States that this has improved    Interval history:  12/6/2023- Patient has had this dry bumpy, \"dot-like\" skin on the back of his upper arms for several years now.  Patient reports that his sisters also have this.  Patient is concerned that it is a problem.     Bmi 40.0-44.9, Adult (Hcc)    Chronic problem.  Exercise: Works as a mailman.  Been routinely going to the gym and noticed a lot of his symptoms have improved.  Diet: Not a lot of red meat mostly eats chicken or turkey.  Patient has been trying to improve upon his diet.   Patient states that he did lose some weight since her last visit but has gained some back.  Patient concerned about developing diabetes as he recently found out both of his grandmothers passed away from diabetes.  His mother may possibly have prediabetes.     Allergies: Lactose  Review of Systems " "  Constitutional:  Negative for chills, fever and weight loss.   Respiratory:  Negative for cough, shortness of breath and wheezing.    Cardiovascular:  Negative for chest pain, palpitations and leg swelling.   Gastrointestinal:  Negative for abdominal pain, blood in stool, constipation, diarrhea, nausea and vomiting.   Genitourinary:  Negative for hematuria.   Skin:  Negative for itching and rash.   Neurological:  Negative for dizziness, tingling and headaches.     Health Maintenance: Completed   Objective:     /76   Pulse 74   Temp 36.1 °C (96.9 °F) (Temporal)   Resp 20   Ht 1.803 m (5' 11\")   Wt (!) 135 kg (298 lb)   SpO2 99%   BMI 41.56 kg/m²  Body mass index is 41.56 kg/m².     Physical Exam  Vitals reviewed.   Constitutional:       General: He is not in acute distress.     Appearance: Normal appearance. He is not ill-appearing.   Cardiovascular:      Rate and Rhythm: Normal rate and regular rhythm.   Pulmonary:      Effort: Pulmonary effort is normal. No respiratory distress.   Skin:     Coloration: Skin is not jaundiced or pale.   Neurological:      General: No focal deficit present.      Mental Status: He is alert and oriented to person, place, and time.   Psychiatric:         Mood and Affect: Mood normal.         Behavior: Behavior normal.         Thought Content: Thought content normal.         Judgment: Judgment normal.       Assessment and Plan:     The following treatment plan was discussed through shared decision making with the patient:    1. Chest pressure  Chronic, controlled.  At this time patient feels like this is resolved since seeing psychiatrist and improving overall diet and lifestyle modifications.  Reinforced patient to seek out urgent care if this persists or develops any signs or symptoms associate with shortness of breath at rest or crushing chest pain.  This chest pressure was likely associated with anxiety..    2. BMI 40.0-44.9, adult (HCC)  Chronic ongoing.  Patient has " noted an overall improvement in his health even with weight gain.  I discussed with the patient that this likely could be an increase in muscle mass with fat loss.  We reviewed that the lean muscle mass can help lose weight even at rest the larger amount of lean muscle mass you have.  Encourage patient to continue with improvement of diet and lifestyle modifications.  Patient will get updated lab work within the next week to further screen for any possible prediabetes or underlying conditions.  Reviewed some of his medications and encourage patient to start taking his calcium tablets at night so that his other medications are better absorbed.    3. Keratosis pilaris  Chronic, controlled.  Continue with emollient creams/lotions as well as exfoliating to help decrease the presence of KP.      Return in about 6 months (around 9/7/2024) for Annual.         Please note that this note was created using dictation with voice recognition software. I have made every reasonable attempt to correct obvious errors, but I expect that there are errors of grammar and possibly content that I did not discover before finalizing the note.    DOYLE Myers  [facility]  [facility]

## 2024-03-07 ENCOUNTER — OFFICE VISIT (OUTPATIENT)
Dept: MEDICAL GROUP | Facility: PHYSICIAN GROUP | Age: 29
End: 2024-03-07
Payer: COMMERCIAL

## 2024-03-07 VITALS
WEIGHT: 298 LBS | RESPIRATION RATE: 20 BRPM | HEIGHT: 71 IN | OXYGEN SATURATION: 99 % | DIASTOLIC BLOOD PRESSURE: 76 MMHG | HEART RATE: 74 BPM | SYSTOLIC BLOOD PRESSURE: 130 MMHG | TEMPERATURE: 96.9 F | BODY MASS INDEX: 41.72 KG/M2

## 2024-03-07 DIAGNOSIS — L85.8 KERATOSIS PILARIS: ICD-10-CM

## 2024-03-07 DIAGNOSIS — R07.89 CHEST PRESSURE: ICD-10-CM

## 2024-03-07 PROCEDURE — 3078F DIAST BP <80 MM HG: CPT

## 2024-03-07 PROCEDURE — 3075F SYST BP GE 130 - 139MM HG: CPT

## 2024-03-07 PROCEDURE — 99213 OFFICE O/P EST LOW 20 MIN: CPT

## 2024-03-07 RX ORDER — OMEGA-3 FATTY ACIDS/FISH OIL 300-1000MG
CAPSULE ORAL
COMMUNITY

## 2024-03-07 RX ORDER — METHYLPHENIDATE HYDROCHLORIDE 5 MG/1
5 TABLET ORAL
COMMUNITY
Start: 2024-02-20

## 2024-03-07 RX ORDER — LANOLIN ALCOHOL/MO/W.PET/CERES
CREAM (GRAM) TOPICAL
COMMUNITY

## 2024-03-07 ASSESSMENT — ENCOUNTER SYMPTOMS
FEVER: 0
NAUSEA: 0
WHEEZING: 0
CONSTIPATION: 0
CHILLS: 0
WEIGHT LOSS: 0
BLOOD IN STOOL: 0
COUGH: 0
ABDOMINAL PAIN: 0
SHORTNESS OF BREATH: 0
DIZZINESS: 0
VOMITING: 0
HEADACHES: 0
PALPITATIONS: 0
TINGLING: 0
DIARRHEA: 0

## 2024-03-07 ASSESSMENT — PATIENT HEALTH QUESTIONNAIRE - PHQ9: CLINICAL INTERPRETATION OF PHQ2 SCORE: 0

## 2024-03-11 ENCOUNTER — HOSPITAL ENCOUNTER (OUTPATIENT)
Dept: LAB | Facility: MEDICAL CENTER | Age: 29
End: 2024-03-11
Payer: COMMERCIAL

## 2024-03-11 DIAGNOSIS — Z13.29 SCREENING FOR ENDOCRINE, METABOLIC AND IMMUNITY DISORDER: ICD-10-CM

## 2024-03-11 DIAGNOSIS — Z13.228 SCREENING FOR ENDOCRINE, METABOLIC AND IMMUNITY DISORDER: ICD-10-CM

## 2024-03-11 DIAGNOSIS — E55.9 VITAMIN D DEFICIENCY: ICD-10-CM

## 2024-03-11 DIAGNOSIS — Z13.0 SCREENING FOR ENDOCRINE, METABOLIC AND IMMUNITY DISORDER: ICD-10-CM

## 2024-03-11 LAB
25(OH)D3 SERPL-MCNC: 16 NG/ML (ref 30–100)
ALBUMIN SERPL BCP-MCNC: 4.4 G/DL (ref 3.2–4.9)
ALBUMIN/GLOB SERPL: 1.3 G/DL
ALP SERPL-CCNC: 93 U/L (ref 30–99)
ALT SERPL-CCNC: 45 U/L (ref 2–50)
ANION GAP SERPL CALC-SCNC: 13 MMOL/L (ref 7–16)
AST SERPL-CCNC: 29 U/L (ref 12–45)
BASOPHILS # BLD AUTO: 0.2 % (ref 0–1.8)
BASOPHILS # BLD: 0.02 K/UL (ref 0–0.12)
BILIRUB SERPL-MCNC: 0.5 MG/DL (ref 0.1–1.5)
BUN SERPL-MCNC: 14 MG/DL (ref 8–22)
CALCIUM ALBUM COR SERPL-MCNC: 9.3 MG/DL (ref 8.5–10.5)
CALCIUM SERPL-MCNC: 9.6 MG/DL (ref 8.5–10.5)
CHLORIDE SERPL-SCNC: 104 MMOL/L (ref 96–112)
CHOLEST SERPL-MCNC: 222 MG/DL (ref 100–199)
CO2 SERPL-SCNC: 24 MMOL/L (ref 20–33)
CREAT SERPL-MCNC: 0.94 MG/DL (ref 0.5–1.4)
EOSINOPHIL # BLD AUTO: 0.07 K/UL (ref 0–0.51)
EOSINOPHIL NFR BLD: 0.8 % (ref 0–6.9)
ERYTHROCYTE [DISTWIDTH] IN BLOOD BY AUTOMATED COUNT: 42.7 FL (ref 35.9–50)
EST. AVERAGE GLUCOSE BLD GHB EST-MCNC: 120 MG/DL
FASTING STATUS PATIENT QL REPORTED: NORMAL
GFR SERPLBLD CREATININE-BSD FMLA CKD-EPI: 112 ML/MIN/1.73 M 2
GLOBULIN SER CALC-MCNC: 3.4 G/DL (ref 1.9–3.5)
GLUCOSE SERPL-MCNC: 111 MG/DL (ref 65–99)
HBA1C MFR BLD: 5.8 % (ref 4–5.6)
HCT VFR BLD AUTO: 50 % (ref 42–52)
HDLC SERPL-MCNC: 40 MG/DL
HGB BLD-MCNC: 16.8 G/DL (ref 14–18)
IMM GRANULOCYTES # BLD AUTO: 0.05 K/UL (ref 0–0.11)
IMM GRANULOCYTES NFR BLD AUTO: 0.6 % (ref 0–0.9)
LDLC SERPL CALC-MCNC: 150 MG/DL
LYMPHOCYTES # BLD AUTO: 3.1 K/UL (ref 1–4.8)
LYMPHOCYTES NFR BLD: 34.4 % (ref 22–41)
MCH RBC QN AUTO: 30.3 PG (ref 27–33)
MCHC RBC AUTO-ENTMCNC: 33.6 G/DL (ref 32.3–36.5)
MCV RBC AUTO: 90.3 FL (ref 81.4–97.8)
MONOCYTES # BLD AUTO: 0.58 K/UL (ref 0–0.85)
MONOCYTES NFR BLD AUTO: 6.4 % (ref 0–13.4)
NEUTROPHILS # BLD AUTO: 5.19 K/UL (ref 1.82–7.42)
NEUTROPHILS NFR BLD: 57.6 % (ref 44–72)
NRBC # BLD AUTO: 0 K/UL
NRBC BLD-RTO: 0 /100 WBC (ref 0–0.2)
PLATELET # BLD AUTO: 257 K/UL (ref 164–446)
PMV BLD AUTO: 10.3 FL (ref 9–12.9)
POTASSIUM SERPL-SCNC: 4.9 MMOL/L (ref 3.6–5.5)
PROT SERPL-MCNC: 7.8 G/DL (ref 6–8.2)
RBC # BLD AUTO: 5.54 M/UL (ref 4.7–6.1)
SODIUM SERPL-SCNC: 141 MMOL/L (ref 135–145)
TRIGL SERPL-MCNC: 160 MG/DL (ref 0–149)
TSH SERPL DL<=0.005 MIU/L-ACNC: 1.3 UIU/ML (ref 0.38–5.33)
WBC # BLD AUTO: 9 K/UL (ref 4.8–10.8)

## 2024-03-11 PROCEDURE — 83036 HEMOGLOBIN GLYCOSYLATED A1C: CPT

## 2024-03-11 PROCEDURE — 82306 VITAMIN D 25 HYDROXY: CPT

## 2024-03-11 PROCEDURE — 84443 ASSAY THYROID STIM HORMONE: CPT

## 2024-03-11 PROCEDURE — 80061 LIPID PANEL: CPT

## 2024-03-11 PROCEDURE — 80053 COMPREHEN METABOLIC PANEL: CPT

## 2024-03-11 PROCEDURE — 36415 COLL VENOUS BLD VENIPUNCTURE: CPT

## 2024-03-11 PROCEDURE — 85025 COMPLETE CBC W/AUTO DIFF WBC: CPT

## 2024-03-14 DIAGNOSIS — R73.03 PREDIABETES: ICD-10-CM

## 2024-03-14 DIAGNOSIS — E55.9 VITAMIN D DEFICIENCY: ICD-10-CM

## 2024-03-14 DIAGNOSIS — E78.5 DYSLIPIDEMIA: ICD-10-CM

## 2024-03-14 RX ORDER — ERGOCALCIFEROL 1.25 MG/1
50000 CAPSULE ORAL
Qty: 12 CAPSULE | Refills: 2 | Status: SHIPPED | OUTPATIENT
Start: 2024-03-14

## 2024-03-14 NOTE — PROGRESS NOTES
Recent lab work 3/14/2024 showed slightly elevated hemoglobin A1c indicating prediabetes, significantly elevated lipid panel indicating dyslipidemia, and low vitamin D levels indicating vitamin D deficiency.  Patient will start taking ergocalciferol 50,000 units every 7 days for the next 6 months.  We will repeat lab work including lipid panel, hemoglobin A1c, and vitamin D levels to see if diet and lifestyle modifications have improved in 6 months.  Labs will need to be fasting.

## 2024-05-24 DIAGNOSIS — G89.29 CHRONIC RIGHT-SIDED LOW BACK PAIN WITH RIGHT-SIDED SCIATICA: ICD-10-CM

## 2024-05-24 DIAGNOSIS — M54.41 CHRONIC RIGHT-SIDED LOW BACK PAIN WITH RIGHT-SIDED SCIATICA: ICD-10-CM

## 2024-05-24 RX ORDER — IBUPROFEN 800 MG/1
800 TABLET ORAL EVERY 8 HOURS PRN
Qty: 30 TABLET | Refills: 0 | Status: SHIPPED | OUTPATIENT
Start: 2024-05-24

## 2024-05-24 NOTE — TELEPHONE ENCOUNTER
Received request via: Patient    Was the patient seen in the last year in this department? Yes    Does the patient have an active prescription (recently filled or refills available) for medication(s) requested? No    Pharmacy Name: [facility]     Does the patient have [facility] Plus and need 100 day supply (blood pressure, diabetes and cholesterol meds only)? Patient does not have SCP

## 2024-09-09 ENCOUNTER — APPOINTMENT (OUTPATIENT)
Dept: MEDICAL GROUP | Facility: PHYSICIAN GROUP | Age: 29
End: 2024-09-09
Payer: COMMERCIAL

## 2024-09-12 ENCOUNTER — APPOINTMENT (OUTPATIENT)
Dept: MEDICAL GROUP | Facility: PHYSICIAN GROUP | Age: 29
End: 2024-09-12
Payer: COMMERCIAL

## 2024-09-12 VITALS
BODY MASS INDEX: 41.07 KG/M2 | HEART RATE: 96 BPM | SYSTOLIC BLOOD PRESSURE: 108 MMHG | DIASTOLIC BLOOD PRESSURE: 70 MMHG | HEIGHT: 71 IN | RESPIRATION RATE: 18 BRPM | WEIGHT: 293.38 LBS | OXYGEN SATURATION: 100 % | TEMPERATURE: 97.7 F

## 2024-09-12 DIAGNOSIS — G89.29 CHRONIC RIGHT-SIDED LOW BACK PAIN WITH RIGHT-SIDED SCIATICA: ICD-10-CM

## 2024-09-12 DIAGNOSIS — M54.41 CHRONIC RIGHT-SIDED LOW BACK PAIN WITH RIGHT-SIDED SCIATICA: ICD-10-CM

## 2024-09-12 DIAGNOSIS — E55.9 VITAMIN D DEFICIENCY: ICD-10-CM

## 2024-09-12 PROCEDURE — 3078F DIAST BP <80 MM HG: CPT

## 2024-09-12 PROCEDURE — 3074F SYST BP LT 130 MM HG: CPT

## 2024-09-12 PROCEDURE — 99214 OFFICE O/P EST MOD 30 MIN: CPT

## 2024-09-12 RX ORDER — ERGOCALCIFEROL 1.25 MG/1
50000 CAPSULE ORAL
Qty: 12 CAPSULE | Refills: 2 | Status: SHIPPED | OUTPATIENT
Start: 2024-09-12 | End: 2024-09-12

## 2024-09-12 RX ORDER — IBUPROFEN 800 MG/1
800 TABLET, FILM COATED ORAL EVERY 8 HOURS PRN
Qty: 30 TABLET | Refills: 0 | Status: SHIPPED | OUTPATIENT
Start: 2024-09-12

## 2024-09-12 RX ORDER — ERGOCALCIFEROL 1.25 MG/1
50000 CAPSULE ORAL
Qty: 12 CAPSULE | Refills: 2 | Status: SHIPPED | OUTPATIENT
Start: 2024-09-12

## 2024-09-12 ASSESSMENT — FIBROSIS 4 INDEX: FIB4 SCORE: 0.49

## 2024-09-12 NOTE — PROGRESS NOTES
"Verbal consent was acquired by the patient to use [employer] ambient listening note generation during this visit Yes     Subjective:     Chief Complaint   Patient presents with    Follow-Up     History of Present Illness  He is seeking a work note to continue to limit his work hours to 8 hours per day, 5 days a week. He has not been attending physical therapy sessions. His back pain began in 2019 and he had previously sought treatment from a chiropractor. He is currently managing his pain with ibuprofen 800 mg and requires a refill of this medication.    Allergies: Lactose  ROS per HPI  Health Maintenance: Deferred at this time   Objective:     /70 (BP Location: Left arm, Patient Position: Sitting, BP Cuff Size: Large adult)   Pulse 96   Temp 36.5 °C (97.7 °F) (Temporal)   Resp 18   Ht 1.803 m (5' 11\")   Wt (!) 133 kg (293 lb 6 oz)   SpO2 100%   BMI 40.92 kg/m²  Body mass index is 40.92 kg/m².     Physical Exam  Vitals reviewed.   Constitutional:       General: He is not in acute distress.     Appearance: Normal appearance. He is not ill-appearing.   Cardiovascular:      Rate and Rhythm: Normal rate and regular rhythm.   Pulmonary:      Effort: Pulmonary effort is normal. No respiratory distress.   Skin:     Coloration: Skin is not jaundiced or pale.   Neurological:      General: No focal deficit present.      Mental Status: He is alert and oriented to person, place, and time.   Psychiatric:         Mood and Affect: Mood normal.         Behavior: Behavior normal.         Thought Content: Thought content normal.         Judgment: Judgment normal.        Results for orders placed or performed during the hospital encounter of 03/11/24   TSH WITH REFLEX TO FT4   Result Value Ref Range    TSH 1.300 0.380 - 5.330 uIU/mL   VITAMIN D,25 HYDROXY (DEFICIENCY)   Result Value Ref Range    25-Hydroxy   Vitamin D 25 16 (L) 30 - 100 ng/mL   Lipid Profile   Result Value Ref Range    Cholesterol,Tot 222 (H) 100 - 199 " mg/dL    Triglycerides 160 (H) 0 - 149 mg/dL    HDL 40 >=40 mg/dL     (H) <100 mg/dL   HEMOGLOBIN A1C   Result Value Ref Range    Glycohemoglobin 5.8 (H) 4.0 - 5.6 %    Est Avg Glucose 120 mg/dL   Comp Metabolic Panel   Result Value Ref Range    Sodium 141 135 - 145 mmol/L    Potassium 4.9 3.6 - 5.5 mmol/L    Chloride 104 96 - 112 mmol/L    Co2 24 20 - 33 mmol/L    Anion Gap 13.0 7.0 - 16.0    Glucose 111 (H) 65 - 99 mg/dL    Bun 14 8 - 22 mg/dL    Creatinine 0.94 0.50 - 1.40 mg/dL    Calcium 9.6 8.5 - 10.5 mg/dL    Correct Calcium 9.3 8.5 - 10.5 mg/dL    AST(SGOT) 29 12 - 45 U/L    ALT(SGPT) 45 2 - 50 U/L    Alkaline Phosphatase 93 30 - 99 U/L    Total Bilirubin 0.5 0.1 - 1.5 mg/dL    Albumin 4.4 3.2 - 4.9 g/dL    Total Protein 7.8 6.0 - 8.2 g/dL    Globulin 3.4 1.9 - 3.5 g/dL    A-G Ratio 1.3 g/dL   CBC WITH DIFFERENTIAL   Result Value Ref Range    WBC 9.0 4.8 - 10.8 K/uL    RBC 5.54 4.70 - 6.10 M/uL    Hemoglobin 16.8 14.0 - 18.0 g/dL    Hematocrit 50.0 42.0 - 52.0 %    MCV 90.3 81.4 - 97.8 fL    MCH 30.3 27.0 - 33.0 pg    MCHC 33.6 32.3 - 36.5 g/dL    RDW 42.7 35.9 - 50.0 fL    Platelet Count 257 164 - 446 K/uL    MPV 10.3 9.0 - 12.9 fL    Neutrophils-Polys 57.60 44.00 - 72.00 %    Lymphocytes 34.40 22.00 - 41.00 %    Monocytes 6.40 0.00 - 13.40 %    Eosinophils 0.80 0.00 - 6.90 %    Basophils 0.20 0.00 - 1.80 %    Immature Granulocytes 0.60 0.00 - 0.90 %    Nucleated RBC 0.00 0.00 - 0.20 /100 WBC    Neutrophils (Absolute) 5.19 1.82 - 7.42 K/uL    Lymphs (Absolute) 3.10 1.00 - 4.80 K/uL    Monos (Absolute) 0.58 0.00 - 0.85 K/uL    Eos (Absolute) 0.07 0.00 - 0.51 K/uL    Baso (Absolute) 0.02 0.00 - 0.12 K/uL    Immature Granulocytes (abs) 0.05 0.00 - 0.11 K/uL    NRBC (Absolute) 0.00 K/uL   FASTING STATUS   Result Value Ref Range    Fasting Status Fasting    ESTIMATED GFR   Result Value Ref Range    GFR (CKD-EPI) 112 >60 mL/min/1.73 m 2        Assessment and Plan:     The following treatment plan was  discussed through shared decision making with the patient:    1. Chronic right-sided low back pain with right-sided sciatica  ibuprofen (MOTRIN) 800 MG Tab      2. Vitamin D deficiency  ergocalciferol (DRISDOL) 23047 UNIT capsule    DISCONTINUED: ergocalciferol (DRISDOL) 16507 UNIT capsule        Assessment & Plan  His chronic lower back pain worsens with overexertion. To manage this, he requires an 8-hour work shift and the ability to use his scheduled off days, which will help him continue working regularly. A work note has been provided, effective until 9/2025. He is currently using ibuprofen 800 mg for pain management and has requested a refill. A prescription for ibuprofen 800 mg has been sent to his pharmacy. Additionally, a prescription for vitamin D has also been sent to his pharmacy.    Complete hemoglobin A1c, vitamin D and cholesterol panel and follow-up in several weeks    Return in about 4 weeks (around 10/10/2024) for Labs.         Please note that this note was created using dictation with voice recognition software. I have made every reasonable attempt to correct obvious errors, but I expect that there are errors of grammar and possibly content that I did not discover before finalizing the note.    DOYLE Myers  [facility]  [facility]

## 2024-09-12 NOTE — LETTER
September 12, 2024    To Whom It May Concern:         This is confirmation that Sukumar Graff attended his scheduled appointment with JM Myers on 9/12/24.    He has chronic lower back pain which is worsened with overexertion.  He needs to be on 8-hour shifts and be allowed to use his scheduled off days which will positively impact his ability to continue to work on a regular basis.         If you have any questions please do not hesitate to call me at the phone number listed below.    Sincerely,          JOSEPH Myers.  [phone]

## 2024-09-12 NOTE — LETTER
September 12, 2024    To Whom It May Concern:         This is confirmation that Sukumar Graff attended his scheduled appointment with JM Myers on 9/12/24.    He has chronic lower back pain which is worsened with overexertion.  He needs to be on 8-hour shifts and be allowed to use his scheduled off days which will positively impact his ability to continue to work on a regular basis. Please follow these guidelines until 9/12/2025.         If you have any questions please do not hesitate to call me at the phone number listed below.    Sincerely,          Nara Field A.P.R.N.  [phone]

## 2024-10-21 ENCOUNTER — OFFICE VISIT (OUTPATIENT)
Dept: URGENT CARE | Facility: PHYSICIAN GROUP | Age: 29
End: 2024-10-21
Payer: COMMERCIAL

## 2024-10-21 VITALS
SYSTOLIC BLOOD PRESSURE: 110 MMHG | BODY MASS INDEX: 39.7 KG/M2 | TEMPERATURE: 97.3 F | RESPIRATION RATE: 16 BRPM | WEIGHT: 293.1 LBS | HEIGHT: 72 IN | DIASTOLIC BLOOD PRESSURE: 78 MMHG | HEART RATE: 96 BPM | OXYGEN SATURATION: 98 %

## 2024-10-21 DIAGNOSIS — S29.012A STRAIN OF MID-BACK, INITIAL ENCOUNTER: ICD-10-CM

## 2024-10-21 PROCEDURE — 3078F DIAST BP <80 MM HG: CPT | Performed by: NURSE PRACTITIONER

## 2024-10-21 PROCEDURE — 3074F SYST BP LT 130 MM HG: CPT | Performed by: NURSE PRACTITIONER

## 2024-10-21 PROCEDURE — 99213 OFFICE O/P EST LOW 20 MIN: CPT | Performed by: NURSE PRACTITIONER

## 2024-10-21 ASSESSMENT — ENCOUNTER SYMPTOMS
NECK PAIN: 0
SENSORY CHANGE: 0
FOCAL WEAKNESS: 0
CHILLS: 0
TINGLING: 0
BACK PAIN: 1
FEVER: 0

## 2024-10-21 ASSESSMENT — FIBROSIS 4 INDEX: FIB4 SCORE: 0.5

## 2024-11-08 ENCOUNTER — HOSPITAL ENCOUNTER (OUTPATIENT)
Dept: LAB | Facility: MEDICAL CENTER | Age: 29
End: 2024-11-08
Payer: COMMERCIAL

## 2024-11-08 DIAGNOSIS — E55.9 VITAMIN D DEFICIENCY: ICD-10-CM

## 2024-11-08 DIAGNOSIS — E78.5 DYSLIPIDEMIA: ICD-10-CM

## 2024-11-08 DIAGNOSIS — R73.03 PREDIABETES: ICD-10-CM

## 2024-11-08 LAB
25(OH)D3 SERPL-MCNC: 29 NG/ML (ref 30–100)
CHOLEST SERPL-MCNC: 185 MG/DL (ref 100–199)
EST. AVERAGE GLUCOSE BLD GHB EST-MCNC: 120 MG/DL
HBA1C MFR BLD: 5.8 % (ref 4–5.6)
HDLC SERPL-MCNC: 38 MG/DL
LDLC SERPL CALC-MCNC: 125 MG/DL
TRIGL SERPL-MCNC: 112 MG/DL (ref 0–149)

## 2024-11-08 PROCEDURE — 82306 VITAMIN D 25 HYDROXY: CPT

## 2024-11-08 PROCEDURE — 83036 HEMOGLOBIN GLYCOSYLATED A1C: CPT

## 2024-11-08 PROCEDURE — 80061 LIPID PANEL: CPT

## 2024-11-08 PROCEDURE — 36415 COLL VENOUS BLD VENIPUNCTURE: CPT

## 2025-01-16 ENCOUNTER — APPOINTMENT (OUTPATIENT)
Dept: MEDICAL GROUP | Facility: PHYSICIAN GROUP | Age: 30
End: 2025-01-16
Payer: COMMERCIAL

## 2025-01-16 VITALS
BODY MASS INDEX: 41.86 KG/M2 | OXYGEN SATURATION: 98 % | WEIGHT: 299 LBS | HEART RATE: 84 BPM | SYSTOLIC BLOOD PRESSURE: 128 MMHG | TEMPERATURE: 98.3 F | DIASTOLIC BLOOD PRESSURE: 74 MMHG | RESPIRATION RATE: 18 BRPM | HEIGHT: 71 IN

## 2025-01-16 DIAGNOSIS — G89.29 CHRONIC RIGHT-SIDED LOW BACK PAIN WITH RIGHT-SIDED SCIATICA: ICD-10-CM

## 2025-01-16 DIAGNOSIS — Z23 NEED FOR VACCINATION: ICD-10-CM

## 2025-01-16 DIAGNOSIS — E55.9 VITAMIN D DEFICIENCY: ICD-10-CM

## 2025-01-16 DIAGNOSIS — R73.03 PREDIABETES: ICD-10-CM

## 2025-01-16 DIAGNOSIS — E78.5 DYSLIPIDEMIA: ICD-10-CM

## 2025-01-16 DIAGNOSIS — M54.41 CHRONIC RIGHT-SIDED LOW BACK PAIN WITH RIGHT-SIDED SCIATICA: ICD-10-CM

## 2025-01-16 PROCEDURE — 99214 OFFICE O/P EST MOD 30 MIN: CPT | Mod: 25

## 2025-01-16 PROCEDURE — 90656 IIV3 VACC NO PRSV 0.5 ML IM: CPT

## 2025-01-16 PROCEDURE — 3078F DIAST BP <80 MM HG: CPT

## 2025-01-16 PROCEDURE — 90471 IMMUNIZATION ADMIN: CPT

## 2025-01-16 PROCEDURE — 3074F SYST BP LT 130 MM HG: CPT

## 2025-01-16 ASSESSMENT — FIBROSIS 4 INDEX: FIB4 SCORE: 0.5

## 2025-01-16 ASSESSMENT — PATIENT HEALTH QUESTIONNAIRE - PHQ9: CLINICAL INTERPRETATION OF PHQ2 SCORE: 0

## 2025-01-16 NOTE — LETTER
January 16, 2025    To Whom It May Concern:         This is confirmation that Sukumar Freeman attended his scheduled appointment with JM Myers on 1/16/25.    He has chronic lower back pain which has improved with his reduced work schedule and working on stretching/exercises.  The patient is able to extend his work day to 9-hour shifts but the patient should continue to be allowed to use his scheduled off days which will positively impact his ability to continue to work on a regular basis. Please follow these guidelines until 7/16/2025.         If you have any questions please do not hesitate to call me at the phone number listed below.    Sincerely,          JOSEPH Myers.  [phone]

## 2025-01-16 NOTE — PROGRESS NOTES
"Subjective:     Chief Complaint   Patient presents with    Follow-Up     Lab results and changing restriction      History of Present Illness  The patient is a 29-year-old male here to follow up on lab results.    He reports an improvement in his back pain, which has allowed him to extend his work hours to 9 hours per day.    He has been actively engaged in physical exercise, resulting in muscle mass gain. However, he acknowledges that his dietary habits have not significantly changed, leading to an overall weight increase. He has made some modifications to his diet, such as incorporating avocados into his meals and eliminating bread and rice, opting for brown rice instead. He has also reduced his intake of bologna and hot dogs, and is making efforts to decrease his sugar consumption. Despite these changes, he continues to struggle with maintaining a balanced diet.    MEDICATIONS  Current: vitamin D    Allergies: Lactose  ROS per HPI  Health Maintenance: Deferred   Objective:     /74 (BP Location: Left arm, Patient Position: Sitting, BP Cuff Size: Large adult)   Pulse 84   Temp 36.8 °C (98.3 °F) (Temporal)   Resp 18   Ht 1.803 m (5' 11\")   Wt (!) 136 kg (299 lb)   SpO2 98%   BMI 41.70 kg/m²  Body mass index is 41.7 kg/m².     Physical Exam  Vitals reviewed.   Constitutional:       General: He is not in acute distress.     Appearance: Normal appearance. He is not ill-appearing.   Cardiovascular:      Rate and Rhythm: Normal rate and regular rhythm.   Pulmonary:      Effort: Pulmonary effort is normal. No respiratory distress.   Skin:     Coloration: Skin is not jaundiced or pale.   Neurological:      General: No focal deficit present.      Mental Status: He is alert and oriented to person, place, and time.   Psychiatric:         Mood and Affect: Mood normal.         Behavior: Behavior normal.         Thought Content: Thought content normal.         Judgment: Judgment normal.        Results  Laboratory " Studies  Vitamin D is low. A1c is 5.8. Total cholesterol is 125, HDL is 38, LDL is 125, triglycerides are 112.    Results for orders placed or performed during the hospital encounter of 11/08/24   VITAMIN D,25 HYDROXY (DEFICIENCY)    Collection Time: 11/08/24  8:18 AM   Result Value Ref Range    25-Hydroxy   Vitamin D 25 29 (L) 30 - 100 ng/mL   Lipid Profile    Collection Time: 11/08/24  8:18 AM   Result Value Ref Range    Cholesterol,Tot 185 100 - 199 mg/dL    Triglycerides 112 0 - 149 mg/dL    HDL 38 (A) >=40 mg/dL     (H) <100 mg/dL   HEMOGLOBIN A1C    Collection Time: 11/08/24  8:18 AM   Result Value Ref Range    Glycohemoglobin 5.8 (H) 4.0 - 5.6 %    Est Avg Glucose 120 mg/dL      Assessment and Plan:     The following treatment plan was discussed through shared decision making with the patient:    1. Chronic right-sided low back pain with right-sided sciatica        2. Prediabetes  HEMOGLOBIN A1C      3. Vitamin D deficiency  VITAMIN D,25 HYDROXY (DEFICIENCY)      4. Dyslipidemia  Lipid Profile      5. Need for vaccination  INFLUENZA VACCINE TRI INJ (PF)        Assessment & Plan  1. Chronic back pain.  His chronic back pain has shown improvement with a reduced work schedule and the incorporation of stretching exercises. He is now capable of extending his workday to 9-hour shifts. However, it is recommended that he continues to utilize his scheduled days off to maintain his well-being and work capacity. A work note reflecting this adjustment has been provided. If his back pain worsens, he should return for further evaluation and potential adjustment of his treatment plan.    2. Prediabetes.  His A1c level remains at 5.8, indicating a prediabetic state. He is advised to continue his current diet and exercise regimen, with a focus on reducing sugar intake. He is encouraged to incorporate healthy fats such as almonds, nuts, cashews, and avocados into his diet. The potential benefits of cooking starchy  vegetables the day prior to consumption, cooling them, and reheating them the following day to reduce glycemic response have been discussed.    3. Vitamin D insufficiency.  His vitamin D levels remain below the desired range, but they are not deficient. He is advised to continue his current vitamin D regimen until the end of the year, at which point his levels will be reassessed.    4. Hyperlipidemia.  His cholesterol levels have significantly improved compared to the previous assessment. Total cholesterol has decreased from 222 to 125, triglycerides have reduced from 160 to 112, HDL is slightly below the desired level at 38, and LDL has decreased from 150 to 125. He is advised to continue his current diet and exercise regimen, with a focus on reducing saturated fat intake and incorporating healthier alternatives such as grass-fed beef.    5. Health maintenance.  He will receive his influenza vaccine today.      Return in about 6 months (around 7/16/2025), or if symptoms worsen or fail to improve, for Labs, back pain.       Please note that this note was created using dictation with voice recognition software. I have made every reasonable attempt to correct obvious errors, but I expect that there are errors of grammar and possibly content that I did not discover before finalizing the note.    DOYLE Myers  [facility]  [facility]

## 2025-06-25 ENCOUNTER — HOSPITAL ENCOUNTER (OUTPATIENT)
Dept: LAB | Facility: MEDICAL CENTER | Age: 30
End: 2025-06-25
Payer: COMMERCIAL

## 2025-06-25 DIAGNOSIS — E78.5 DYSLIPIDEMIA: ICD-10-CM

## 2025-06-25 DIAGNOSIS — E55.9 VITAMIN D DEFICIENCY: ICD-10-CM

## 2025-06-25 DIAGNOSIS — R73.03 PREDIABETES: ICD-10-CM

## 2025-06-25 LAB
25(OH)D3 SERPL-MCNC: 23 NG/ML (ref 30–100)
CHOLEST SERPL-MCNC: 192 MG/DL (ref 100–199)
EST. AVERAGE GLUCOSE BLD GHB EST-MCNC: 120 MG/DL
FASTING STATUS PATIENT QL REPORTED: NORMAL
HBA1C MFR BLD: 5.8 % (ref 4–5.6)
HDLC SERPL-MCNC: 40 MG/DL
LDLC SERPL CALC-MCNC: 118 MG/DL
TRIGL SERPL-MCNC: 172 MG/DL (ref 0–149)

## 2025-06-25 PROCEDURE — 83036 HEMOGLOBIN GLYCOSYLATED A1C: CPT

## 2025-06-25 PROCEDURE — 80061 LIPID PANEL: CPT

## 2025-06-25 PROCEDURE — 36415 COLL VENOUS BLD VENIPUNCTURE: CPT

## 2025-06-25 PROCEDURE — 82306 VITAMIN D 25 HYDROXY: CPT

## 2025-07-01 ENCOUNTER — RESULTS FOLLOW-UP (OUTPATIENT)
Dept: MEDICAL GROUP | Facility: PHYSICIAN GROUP | Age: 30
End: 2025-07-01

## 2025-07-11 ENCOUNTER — OFFICE VISIT (OUTPATIENT)
Dept: MEDICAL GROUP | Facility: PHYSICIAN GROUP | Age: 30
End: 2025-07-11
Payer: COMMERCIAL

## 2025-07-11 ENCOUNTER — APPOINTMENT (OUTPATIENT)
Dept: MEDICAL GROUP | Facility: PHYSICIAN GROUP | Age: 30
End: 2025-07-11
Payer: COMMERCIAL

## 2025-07-11 VITALS
OXYGEN SATURATION: 99 % | WEIGHT: 300.8 LBS | RESPIRATION RATE: 12 BRPM | BODY MASS INDEX: 43.06 KG/M2 | TEMPERATURE: 98.4 F | SYSTOLIC BLOOD PRESSURE: 122 MMHG | HEART RATE: 82 BPM | HEIGHT: 70 IN | DIASTOLIC BLOOD PRESSURE: 74 MMHG

## 2025-07-11 DIAGNOSIS — E55.9 VITAMIN D DEFICIENCY: ICD-10-CM

## 2025-07-11 DIAGNOSIS — G89.29 CHRONIC RIGHT-SIDED LOW BACK PAIN WITH RIGHT-SIDED SCIATICA: ICD-10-CM

## 2025-07-11 DIAGNOSIS — R53.83 FATIGUE, UNSPECIFIED TYPE: ICD-10-CM

## 2025-07-11 DIAGNOSIS — E88.810 METABOLIC SYNDROME: ICD-10-CM

## 2025-07-11 DIAGNOSIS — E66.3 OVERWEIGHT: Primary | ICD-10-CM

## 2025-07-11 DIAGNOSIS — M54.41 CHRONIC RIGHT-SIDED LOW BACK PAIN WITH RIGHT-SIDED SCIATICA: ICD-10-CM

## 2025-07-11 DIAGNOSIS — R73.03 PREDIABETES: ICD-10-CM

## 2025-07-11 DIAGNOSIS — E78.5 DYSLIPIDEMIA: ICD-10-CM

## 2025-07-11 PROCEDURE — 3074F SYST BP LT 130 MM HG: CPT | Performed by: PHYSICIAN ASSISTANT

## 2025-07-11 PROCEDURE — 3078F DIAST BP <80 MM HG: CPT | Performed by: PHYSICIAN ASSISTANT

## 2025-07-11 PROCEDURE — 99214 OFFICE O/P EST MOD 30 MIN: CPT | Performed by: PHYSICIAN ASSISTANT

## 2025-07-11 RX ORDER — ERGOCALCIFEROL 1.25 MG/1
50000 CAPSULE ORAL
Qty: 12 CAPSULE | Refills: 2 | Status: SHIPPED | OUTPATIENT
Start: 2025-07-11

## 2025-07-11 RX ORDER — IBUPROFEN 800 MG/1
800 TABLET, FILM COATED ORAL EVERY 8 HOURS PRN
Qty: 30 TABLET | Refills: 0 | Status: SHIPPED | OUTPATIENT
Start: 2025-07-11

## 2025-07-11 ASSESSMENT — FIBROSIS 4 INDEX: FIB4 SCORE: 0.5

## 2025-07-11 ASSESSMENT — ENCOUNTER SYMPTOMS
SHORTNESS OF BREATH: 0
FEVER: 0
CHILLS: 0

## 2025-07-11 NOTE — ASSESSMENT & PLAN NOTE
DOI: 10/28/2019  Back injury, hyperextended at work while stretching  Was evaluated via work comp; deemed not work related.  Goes to chiropractor on a regular basis.  Has been getting letters for work restrictions.  Needs a new letter today.    Interval history 12/6/2023:  Chronic problem.    In 2019, patient was at work when he lifted a very heavy package causing low back pain.  Went to urgent care after this occurred was prescribed muscle relaxer.  Physical therapy was not prescribed.  No imaging was done.  Since then patient occasionally will take a 800 mg ibuprofen once a week.  Regularly sees a chiropractor which he has an appointment for today.    Requesting refill of ibuprofen.

## 2025-07-11 NOTE — PROGRESS NOTES
SUBJECTIVE:     CC: Lab follow-up    HPI:   [facility] presents today with the following:    ASSESSMENT & PLAN by Problem:     Problem   Overweight    Chronic, uncontrolled.  Increase plant-based foods, decrease animal-based foods.  Increase daily activity, aiming for 30-45 minutes brisk exercise daily.     Dyslipidemia    Chronic, uncontrolled.  Increase plant-based foods, decrease animal-based foods.  Increase daily activity, aiming for 30-45 minutes brisk exercise daily.     Prediabetes    Chronic, stable.  A1c: 5.8 (6/2025); 5.8 (11/2025); 5.8 (3/2024).  Increase plant-based foods, decrease animal-based foods.      Vitamin D Deficiency    Chronic, uncontrolled.  Refilling vitamin D, 50,000 IU weekly.     Metabolic Syndrome    Chronic, uncontrolled.  Fasting sugar: 111 (3/2024)  HDL: 40 (6/25/2025) 38 (11/8/2024)  Triglycerides: 172 (6/25/2025)  Obesity  Increase plant-based foods, decrease animal-based foods.  Increase daily activity, aiming for 30-45 minutes brisk exercise daily.       Chronic Right-Sided Low Back Pain With Right-Sided Sciatica    Chronic, ongoing.  Patient has been getting work notes every 6 months requesting work restrictions.  Patient states he was evaluated by work comp and this was deemed not work related.  Follow-up with primary care January 2026 for repeat evaluation and work restriction letter.  Discussed possibly doing this as FMLA/intermittent leave.  He can discuss this with his primary care provider follow-up.         Return in about 6 months (around 1/11/2026), or if symptoms worsen or fail to improve.        HPI:     Problem List Items Addressed This Visit       Chronic right-sided low back pain with right-sided sciatica    DOI: 10/28/2019  Back injury, hyperextended at work while stretching  Was evaluated via work comp; deemed not work related.  Goes to chiropractor on a regular basis.  Has been getting letters for work restrictions.  Needs a new letter today.    Interval history  "12/6/2023:  Chronic problem.    In 2019, patient was at work when he lifted a very heavy package causing low back pain.  Went to urgent care after this occurred was prescribed muscle relaxer.  Physical therapy was not prescribed.  No imaging was done.  Since then patient occasionally will take a 800 mg ibuprofen once a week.  Regularly sees a chiropractor which he has an appointment for today.    Requesting refill of ibuprofen.         Relevant Medications    ibuprofen (MOTRIN) 800 MG Tab    Dyslipidemia    Chronic, uncontrolled.     Latest Labs:   Lab Results   Component Value Date/Time    CHOLSTRLTOT 192 06/25/2025 09:15 AM     (H) 06/25/2025 09:15 AM    HDL 40 06/25/2025 09:15 AM    TRIGLYCERIDE 172 (H) 06/25/2025 09:15 AM        Medications: none    Risk calculator: The ASCVD Risk score (Rey ROSAS, et al., 2019) failed to calculate.            Relevant Orders    Lipid Profile    INSULIN FASTING    CORTISOL    Metabolic syndrome    Overweight - Primary    Chronic, uncontrolled.    7/2025: 300#  1/2025: 299#  10/2024: 223#  12/2023: 296#  9/2022: 285#         Relevant Orders    CBC WITH DIFFERENTIAL    Comp Metabolic Panel    TSH WITH REFLEX TO FT4    INSULIN FASTING    CORTISOL    Prediabetes    Relevant Orders    HEMOGLOBIN A1C    Vitamin D deficiency    Relevant Medications    ergocalciferol (DRISDOL) 62912 UNIT capsule    Other Relevant Orders    VITAMIN D,25 HYDROXY (DEFICIENCY)     Other Visit Diagnoses         Fatigue, unspecified type        Relevant Orders    Testosterone, Free & Total, Adult Male (w/SHBG)    CORTISOL                   ROS:  Review of Systems   Constitutional:  Negative for chills and fever.   Respiratory:  Negative for shortness of breath.    Cardiovascular:  Negative for chest pain.       OBJECTIVE:     Exam:  /74   Pulse 82   Temp 36.9 °C (98.4 °F) (Temporal)   Resp 12   Ht 1.778 m (5' 10\")   Wt (!) 136 kg (300 lb 12.8 oz)   SpO2 99%   BMI 43.16 kg/m²  Body mass " index is 43.16 kg/m².    Physical Exam  Vitals reviewed.   Constitutional:       General: He is not in acute distress.     Appearance: Normal appearance.   Pulmonary:      Effort: Pulmonary effort is normal.   Neurological:      General: No focal deficit present.      Mental Status: He is alert.   Psychiatric:         Mood and Affect: Mood normal.         Behavior: Behavior normal.         Judgment: Judgment normal.           CHART REVIEW:     Labs:                      Please note that this dictation was created using voice recognition software. I have made every reasonable attempt to correct obvious errors, but I expect that there are errors of grammar and possibly content that I did not discover before finalizing the note.

## 2025-07-11 NOTE — LETTER
July 11, 2025    To Whom It May Concern:         This is confirmation that Sukumar Freeman attended his scheduled appointment with Eleni Galvan P.A.-C. on 7/11/25.    He has chronic lower back pain which has improved with his reduced work schedule and working on stretching/exercises. The patient is able to extend his work day to 9-hour shifts but the patient should continue to be allowed to use his scheduled off days which will positively impact his ability to continue to work on a regular basis. Please follow these guidelines until 1/11/2026.         If you have any questions please do not hesitate to call me at the phone number listed below.    Sincerely,          Eleni Galvan P.A.-C.  [phone]

## 2025-07-11 NOTE — ASSESSMENT & PLAN NOTE
Chronic, uncontrolled.     Latest Labs:   Lab Results   Component Value Date/Time    CHOLSTRLTOT 192 06/25/2025 09:15 AM     (H) 06/25/2025 09:15 AM    HDL 40 06/25/2025 09:15 AM    TRIGLYCERIDE 172 (H) 06/25/2025 09:15 AM        Medications: none    Risk calculator: The ASCVD Risk score (Rey ROSAS, et al., 2019) failed to calculate.

## 2026-01-13 ENCOUNTER — APPOINTMENT (OUTPATIENT)
Dept: MEDICAL GROUP | Facility: PHYSICIAN GROUP | Age: 31
End: 2026-01-13
Payer: COMMERCIAL